# Patient Record
(demographics unavailable — no encounter records)

---

## 2024-11-18 NOTE — HEALTH RISK ASSESSMENT
[Very Good] : ~his/her~  mood as very good [No] : In the past 12 months have you used drugs other than those required for medical reasons? No [0] : 2) Feeling down, depressed, or hopeless: Not at all (0) [PHQ-2 Negative - No further assessment needed] : PHQ-2 Negative - No further assessment needed [Never] : Never [Patient reported mammogram was normal] : Patient reported mammogram was normal [Patient reported PAP Smear was normal] : Patient reported PAP Smear was normal [Patient reported colonoscopy was normal] : Patient reported colonoscopy was normal [HIV test declined] : HIV test declined [Hepatitis C test declined] : Hepatitis C test declined [None] : None [With Family] : lives with family [Employed] : employed [] :  [# Of Children ___] : has [unfilled] children [Sexually Active] : sexually active [Feels Safe at Home] : Feels safe at home [Fully functional (bathing, dressing, toileting, transferring, walking, feeding)] : Fully functional (bathing, dressing, toileting, transferring, walking, feeding) [Fully functional (using the telephone, shopping, preparing meals, housekeeping, doing laundry, using] : Fully functional and needs no help or supervision to perform IADLs (using the telephone, shopping, preparing meals, housekeeping, doing laundry, using transportation, managing medications and managing finances) [FreeTextEntry1] : none  [de-identified] : none  [de-identified] : onc  [Audit-CScore] : 0 [de-identified] : walking  [de-identified] : balanced; supplements- none  [[id number]] : 0 [Change in mental status noted] : No change in mental status noted [Language] : denies difficulty with language [High Risk Behavior] : no high risk behavior [Reports changes in hearing] : Reports no changes in hearing [Reports changes in vision] : Reports no changes in vision [Reports changes in dental health] : Reports no changes in dental health [MammogramDate] : 01/2024  [MammogramComments] : [place] [facility] [place]  [PapSmearDate] : 03/2024  [ColonoscopyDate] : 09/2022 [ColonoscopyComments] : repeat in 5 years  [de-identified] : , 2 kids  [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

## 2024-11-18 NOTE — HEALTH RISK ASSESSMENT
[Very Good] : ~his/her~  mood as very good [No] : In the past 12 months have you used drugs other than those required for medical reasons? No [0] : 2) Feeling down, depressed, or hopeless: Not at all (0) [PHQ-2 Negative - No further assessment needed] : PHQ-2 Negative - No further assessment needed [Never] : Never [Patient reported mammogram was normal] : Patient reported mammogram was normal [Patient reported PAP Smear was normal] : Patient reported PAP Smear was normal [Patient reported colonoscopy was normal] : Patient reported colonoscopy was normal [HIV test declined] : HIV test declined [Hepatitis C test declined] : Hepatitis C test declined [None] : None [With Family] : lives with family [Employed] : employed [] :  [# Of Children ___] : has [unfilled] children [Sexually Active] : sexually active [Feels Safe at Home] : Feels safe at home [Fully functional (bathing, dressing, toileting, transferring, walking, feeding)] : Fully functional (bathing, dressing, toileting, transferring, walking, feeding) [Fully functional (using the telephone, shopping, preparing meals, housekeeping, doing laundry, using] : Fully functional and needs no help or supervision to perform IADLs (using the telephone, shopping, preparing meals, housekeeping, doing laundry, using transportation, managing medications and managing finances) [FreeTextEntry1] : none  [de-identified] : none  [de-identified] : onc  [Audit-CScore] : 0 [de-identified] : walking  [de-identified] : balanced; supplements- none  [[id number]] : 0 [Change in mental status noted] : No change in mental status noted [Language] : denies difficulty with language [High Risk Behavior] : no high risk behavior [Reports changes in hearing] : Reports no changes in hearing [Reports changes in vision] : Reports no changes in vision [Reports changes in dental health] : Reports no changes in dental health [MammogramDate] : 01/2024  [[place]ogramComments] [place] [facility] [place]  [PapSmearDate] : 03/2024  [ColonoscopyDate] : 09/2022 [ColonoscopyComments] : repeat in 5 years  [de-identified] : , 2 kids  [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

## 2024-11-18 NOTE — ASSESSMENT
[FreeTextEntry1] : Routine medical examination VSS- exam normal  medication as prescribed, medication education done  Advised healthy diet and exercise will follow up labs  patient verbalizes understanding and patient is stable upon discharge

## 2024-11-18 NOTE — HISTORY OF PRESENT ILLNESS
[FreeTextEntry1] : CPE  [de-identified] : 47 left sided breast ca (s/p chemo, lumpectomy, axillary LN dissection 2021), thalessemia (alpha), here for CPE overall is feeling well denies any other associated symptoms  denies any other complaints or concerns at this time

## 2025-01-13 NOTE — PHYSICAL EXAM
[Normal] : the outer ears and nose were normal in appearance and the oropharynx was normal [Normal Posterior Cervical Nodes] : no posterior cervical lymphadenopathy [Normal Anterior Cervical Nodes] : no anterior cervical lymphadenopathy [de-identified] : palpable LAD below chin and 0.5 cm mobile LAD over left SCM

## 2025-01-13 NOTE — ASSESSMENT
[FreeTextEntry1] : VSS will follow up imaging  advised to increase fluid intake, rest, and acetaminophen/ibuprofen prn pain/fever consider head and neck ent referral if symptoms persists  follow up in office if sx persists or worsens patient verbalizes understanding and is stable upon d/c

## 2025-01-13 NOTE — PHYSICAL EXAM
[Normal] : the outer ears and nose were normal in appearance and the oropharynx was normal [Normal Posterior Cervical Nodes] : no posterior cervical lymphadenopathy [Normal Anterior Cervical Nodes] : no anterior cervical lymphadenopathy [de-identified] : palpable LAD below chin and 0.5 cm mobile LAD over left SCM

## 2025-01-13 NOTE — HISTORY OF PRESENT ILLNESS
[FreeTextEntry8] : 47 left sided breast ca (s/p chemo, lumpectomy, axillary LN dissection 2021), thalessemia (alpha), c/o lumps on neck  noted over the past few days, palpable lump below chin mildly tender to palpation denies any fever or chills also feeling lump on left side of neck by clavicle  denies any night sweats, recent uri symptoms  denies any other associated symptoms denies any other complaints or concerns at this time